# Patient Record
Sex: FEMALE | Race: OTHER
[De-identification: names, ages, dates, MRNs, and addresses within clinical notes are randomized per-mention and may not be internally consistent; named-entity substitution may affect disease eponyms.]

---

## 2017-07-22 ENCOUNTER — HOSPITAL ENCOUNTER (EMERGENCY)
Dept: HOSPITAL 62 - ER | Age: 32
Discharge: HOME | End: 2017-07-22
Payer: SELF-PAY

## 2017-07-22 VITALS — SYSTOLIC BLOOD PRESSURE: 98 MMHG | DIASTOLIC BLOOD PRESSURE: 47 MMHG

## 2017-07-22 DIAGNOSIS — K08.89: Primary | ICD-10-CM

## 2017-07-22 DIAGNOSIS — F17.200: ICD-10-CM

## 2017-07-22 PROCEDURE — 3E0T3BZ INTRODUCTION OF ANESTHETIC AGENT INTO PERIPHERAL NERVES AND PLEXI, PERCUTANEOUS APPROACH: ICD-10-PCS | Performed by: EMERGENCY MEDICINE

## 2017-07-22 PROCEDURE — 64400 NJX AA&/STRD TRIGEMINAL NRV: CPT

## 2017-07-22 PROCEDURE — 99282 EMERGENCY DEPT VISIT SF MDM: CPT

## 2017-07-22 NOTE — ER DOCUMENT REPORT
ED Oral Problem





- General


Chief Complaint: Toothache


Stated Complaint: TOOTHACHE


Time Seen by Provider: 07/22/17 07:26


Mode of Arrival: Ambulatory


Information source: Patient


Notes: 


Patient is a 32-year-old female who presents to the ER today for right upper 

tooth pain that began while eating last night.  Patient states that she could 

not sleep last night due to the pain.  She states she does have a dentist to 

follow-up with, however does not have insurance.


TRAVEL OUTSIDE OF THE [place] IN LAST 30 DAYS: No





- Related Data


Allergies/Adverse Reactions: 


 





No Known Allergies Allergy (Verified 12/15/15 07:56)


 











Past Medical History





- Social History


Smoking Status: Current Every Day Smoker


Chew tobacco use (# tins/day): No


Frequency of alcohol use: None


Drug Abuse: None


Family History: Reviewed & Not Pertinent


Patient has suicidal ideation: No


Patient has homicidal ideation: No





- Past Medical History


Cardiac Medical History: 


   Denies: Hx Coronary Artery Disease, Hx Heart Attack, Hx Hypertension


Pulmonary Medical History: 


   Denies: Hx Asthma, Hx Bronchitis, Hx COPD, Hx Pneumonia


Neurological Medical History: Denies: Hx Cerebrovascular Accident, Hx Seizures


Renal/ Medical History: Denies: Hx Peritoneal Dialysis


Musculoskeltal Medical History: Denies Hx Arthritis


Past Surgical History: Reports: Hx Tubal Ligation





- Immunizations


Hx Diphtheria, Pertussis, Tetanus Vaccination: Yes





Physical Exam





- Vital signs


Vitals: 


 











Temp Pulse Resp BP Pulse Ox


 


 98.2 F   72   17   109/57 L  100 


 


 07/22/17 05:46  07/22/17 05:46  07/22/17 05:46  07/22/17 05:46  07/22/17 05:46














Course





- Vital Signs


Vital signs: 


 











Temp Pulse Resp BP Pulse Ox


 


 98.2 F   60   16   98/47 L  99 


 


 07/22/17 05:46  07/22/17 09:20  07/22/17 09:20  07/22/17 09:20  07/22/17 09:20














Procedures





- Additional Procedures


  ** dental block


Time performed: 08:50 - bupivicaine and epi local block, pt tolerated well





Discharge





- Discharge


Clinical Impression: 


 Toothache





Condition: Stable


Disposition: HOME, SELF-CARE


Instructions:  Penicillin V K ([facility]


Additional Instructions: 


Return immediately for any new or worsening symptoms.





Follow up with primary care provider, call tomorrow to make followup 

appointment.


Prescriptions: 


Penicillin V Potassium [Penicillin Vk 500 mg Tablet] 500 mg PO BID #20 tablet

## 2018-07-14 ENCOUNTER — HOSPITAL ENCOUNTER (EMERGENCY)
Dept: HOSPITAL 62 - ER | Age: 33
Discharge: HOME | End: 2018-07-14
Payer: SELF-PAY

## 2018-07-14 VITALS — DIASTOLIC BLOOD PRESSURE: 58 MMHG | SYSTOLIC BLOOD PRESSURE: 119 MMHG

## 2018-07-14 DIAGNOSIS — R63.4: Primary | ICD-10-CM

## 2018-07-14 DIAGNOSIS — F17.200: ICD-10-CM

## 2018-07-14 PROCEDURE — 99283 EMERGENCY DEPT VISIT LOW MDM: CPT

## 2018-07-14 NOTE — ER DOCUMENT REPORT
ED General





- General


Chief Complaint: Abnormal Lab Results


Stated Complaint: ABNORMAL LABS


Time Seen by Provider: 07/14/18 09:49


Notes: 





33-year-old female here with requests for outpatient blood work.  She states 

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 

was too low.  She went to her primary doctor and her protein was found to be 

normal.  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 

still low so she is here requesting to have her protein checked.  Her only 

symptom is that she has lost a few pounds over the course of 2 years, ever 

since she gave birth to her child.  No other symptoms.


TRAVEL OUTSIDE OF THE [place] IN LAST 30 DAYS: No





- Related Data


Allergies/Adverse Reactions: 


 





No Known Allergies Allergy (Verified 12/15/15 07:56)


 











Past Medical History





- Social History


Smoking Status: Current Every Day Smoker


Chew tobacco use (# tins/day): No


Frequency of alcohol use: None


Drug Abuse: None


Family History: Reviewed & Not Pertinent


Patient has suicidal ideation: No


Patient has homicidal ideation: No





- Past Medical History


Cardiac Medical History: 


   Denies: Hx Coronary Artery Disease, Hx Heart Attack, Hx Hypertension


Pulmonary Medical History: 


   Denies: Hx Asthma, Hx Bronchitis, Hx COPD, Hx Pneumonia


Neurological Medical History: Denies: Hx Cerebrovascular Accident, Hx Seizures


Renal/ Medical History: Denies: Hx Peritoneal Dialysis


Musculoskeletal Medical History: Denies Hx Arthritis


Past Surgical History: Reports: Hx Tubal Ligation





- Immunizations


Hx Diphtheria, Pertussis, Tetanus Vaccination: Yes





Review of Systems





- Review of Systems


Notes: 





See history of present illness for pertinent positive review of systems; 

otherwise all review of systems have been reviewed and are negative





Physical Exam





- Vital signs


Vitals: 





 











Temp Pulse Resp BP Pulse Ox


 


 98.6 F   79   16   119/58 L  98 


 


 07/14/18 09:42  07/14/18 09:42  07/14/18 09:42  07/14/18 09:42  07/14/18 09:42














- Notes


Notes: 





PHYSICAL EXAMINATION:





GENERAL: Well-appearing and in no acute distress.





HEAD: Atraumatic, normocephalic.





EYES: Pupils equal round and reactive to light, extraocular movements intact, 

sclera anicteric, conjunctiva are normal.





ENT: nares patent, oropharynx clear without exudates.  Moist mucous membranes.





NECK: Normal range of motion, supple without lymphadenopathy





LUNGS: CTAB and equal.  No wheezes rales or rhonchi.





HEART: Regular rate and rhythm without murmurs





ABDOMEN: Soft, no tenderness.  No facial grimacing/wincing upon palpation.  No 

guarding, no rebound.





EXTREMITIES: Normal range of motion, no pitting edema.  No cyanosis.





NEUROLOGICAL: Cranial nerves grossly intact. Normal sensory/motor exams.





PSYCH: Normal mood, normal affect.





SKIN: Warm, Dry, normal turgor, no rashes or lesions noted





Course





- Re-evaluation


Re-evalutation: 





07/14/18 09:58


MEDICAL DECISION MAKING:


I have provided this patient with a medical screening exam


She does not have any emergency medical condition today


I have instructed her follow-up PCP, Dr. Mcgraw, for her requested labs 


Patient understands and agrees to the plan of care





- Vital Signs


Vital signs: 





 











Temp Pulse Resp BP Pulse Ox


 


 98.6 F   79   16   119/58 L  98 


 


 07/14/18 09:42  07/14/18 09:42  07/14/18 09:42  07/14/18 09:42  07/14/18 09:42














Discharge





- Discharge


Clinical Impression: 


 Weight loss





Condition: Good


Disposition: HOME, SELF-CARE


Additional Instructions: 


[facility]  You may 

obtain the testing you have requested through your primary physician in an 

outpatient setting.  Please followup with your primary physician in the next 

few days for further management/evaluation.  Please return to the emergency 

department for any symptom that you deem to be concerning or life-threatening.  

Thank you for allowing us to be part of your care.